# Patient Record
(demographics unavailable — no encounter records)

---

## 2025-01-24 NOTE — DISCUSSION/SUMMARY
[FreeTextEntry1] : Lingular opacity pneumonia Repeat chest CT For resolution Highly severe JOSE MARIA Home sleep study Regroup after the above

## 2025-01-24 NOTE — HISTORY OF PRESENT ILLNESS
[TextBox_4] : 35 years old presented for above September 2024 was complaining of cough productive fever went to urgent care chest x-ray was abnormal had a CAT scan done which show opacity with patchy groundglass changes in the lingula took 2 course antibiotics and was referred feels better no old chest CT non-smoker Reports typical JOSE MARIA symptoms snoring excessive daytime sleepiness/

## 2025-01-24 NOTE — REASON FOR VISIT
[Initial] : an initial visit [Abnormal CXR/ Chest CT] : an abnormal CXR/ chest CT [Sleep Apnea] : sleep apnea [Pneumonia] : pneumonia

## 2025-03-13 NOTE — HISTORY OF PRESENT ILLNESS
[TextBox_4] : COUGH W/ ABNORMAL IMAGING, WAS SUPPOSED TO GET REPEAT CHEST CT, NONSMOKER  SP HST- NEGATIVE STUDY; PRIMARY SNORING

## 2025-03-13 NOTE — DISCUSSION/SUMMARY
[FreeTextEntry1] : Lingular opacity pneumonia- Repeat chest CT For resolution, AUTHORIZATION , RE-ORDERED  SNORING; OFFERED IN-LAB SLEEP STUDY, RECOMMEND WEIGHT LOSS

## 2025-03-13 NOTE — REASON FOR VISIT
[Home] : at home, [unfilled] , at the time of the visit. [Medical Office: ([facility])___] : at the medical office located in  [Telehealth (audio & video)] : This visit was provided via telehealth using real-time 2-way audio visual technology. [Follow-Up] : a follow-up visit [Abnormal CXR/ Chest CT] : an abnormal CXR/ chest CT [Sleep Apnea] : sleep apnea [Pneumonia] : pneumonia

## 2025-07-14 NOTE — HISTORY OF PRESENT ILLNESS
[de-identified] : 36-year-old female presents to me with low back pain radiating down her legs.  When she walks her legs are heavy weak and tired like the foot with cement.  Her low back pain radiates down her left thigh.  This been going for many years she is done over 6 weeks of physical therapy she has been diagnosed with ankylosing spondylitis.

## 2025-07-14 NOTE — DATA REVIEWED
[FreeTextEntry1] : I obtained reviewed AP lateral flexion-extension lumbar x-rays.  There are some disc space narrowing in the lower lumbar spine.

## 2025-07-14 NOTE — IMAGING
[de-identified] : TTP midline spine and paraspinal musculature  Strength                                          Hip flexor   Right: 5/5; Left: 5/5                              Knee extensor     Right: 5/5; Left: 5/5                      Ankle dorsiflexion   Right: 5/5; Left: 5/5                   EHL           Right: 5/5; Left: 5/5                                 Ankle plantarflexion       Right: 5/5; Left: 5/5  Sensation L1   Right: 2/2; Left: 2/2 L2   Right: 2/2; Left: 2/2 L3   Right: 2/2; Left: 2/2 L4   Right: 2/2; Left: 2/2 L5   Right: 2/2; Left: 2/2 S1   Right: 2/2; Left: 2/2  Reflexes Patella   Right: 2+; Left 2+ Achilles   Right: 2+; Left 2+ Clonus  Right: absent; L: absent

## 2025-07-14 NOTE — DISCUSSION/SUMMARY
[de-identified] : 36-year-old female presents with lumbar radiculopathy going on for years now.  I will order an MRI of the lumbar spine we will see her back in the cervical 6 weeks of physical therapy.